# Patient Record
Sex: FEMALE | Race: WHITE | NOT HISPANIC OR LATINO | Employment: OTHER | URBAN - METROPOLITAN AREA
[De-identification: names, ages, dates, MRNs, and addresses within clinical notes are randomized per-mention and may not be internally consistent; named-entity substitution may affect disease eponyms.]

---

## 2017-09-05 ENCOUNTER — OFFICE VISIT (OUTPATIENT)
Dept: OTOLARYNGOLOGY | Facility: CLINIC | Age: 67
End: 2017-09-05
Payer: COMMERCIAL

## 2017-09-05 VITALS
BODY MASS INDEX: 17.97 KG/M2 | SYSTOLIC BLOOD PRESSURE: 80 MMHG | DIASTOLIC BLOOD PRESSURE: 52 MMHG | TEMPERATURE: 98 F | HEART RATE: 63 BPM | WEIGHT: 104.75 LBS

## 2017-09-05 DIAGNOSIS — C02.3 MALIGNANT NEOPLASM OF ANTERIOR TWO-THIRDS OF TONGUE: Primary | ICD-10-CM

## 2017-09-05 DIAGNOSIS — M54.50 CHRONIC BILATERAL LOW BACK PAIN WITHOUT SCIATICA: ICD-10-CM

## 2017-09-05 DIAGNOSIS — G89.29 CHRONIC BILATERAL LOW BACK PAIN WITHOUT SCIATICA: ICD-10-CM

## 2017-09-05 PROCEDURE — 1159F MED LIST DOCD IN RCRD: CPT | Mod: S$GLB,,, | Performed by: OTOLARYNGOLOGY

## 2017-09-05 PROCEDURE — 1157F ADVNC CARE PLAN IN RCRD: CPT | Mod: S$GLB,,, | Performed by: OTOLARYNGOLOGY

## 2017-09-05 PROCEDURE — 99214 OFFICE O/P EST MOD 30 MIN: CPT | Mod: S$GLB,,, | Performed by: OTOLARYNGOLOGY

## 2017-09-05 PROCEDURE — 99999 PR PBB SHADOW E&M-EST. PATIENT-LVL IV: CPT | Mod: PBBFAC,,, | Performed by: OTOLARYNGOLOGY

## 2017-09-05 PROCEDURE — 3008F BODY MASS INDEX DOCD: CPT | Mod: S$GLB,,, | Performed by: OTOLARYNGOLOGY

## 2017-09-05 PROCEDURE — 1126F AMNT PAIN NOTED NONE PRSNT: CPT | Mod: S$GLB,,, | Performed by: OTOLARYNGOLOGY

## 2017-09-05 NOTE — PROGRESS NOTES
[facility]    Subjective:       Patient ID: Aleyda Forman is a 67 y.o. female.    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 Tongue biopsy, 8/23/2012 (Dr. Cope) = well-differentiated SCC  2. Right subtotal glossectomy, right SND I-IV, left SND I-III, tracheostomy, PEG, right ALT free flap; 9/24/2012  3. IMRT, completed 1/2/2013    INTERVAL HISTORY: Aleyda Forman (Ham) returns to the [facility] for follow-up. She did not receive concurrent adjuvant therapy, only IMRT. Even with that, she experienced multiple treatment breaks, and had to have her mask re-fit at least twice. She is tolerating an oral diet, and her PEG tube fell out in November 2014. She mostly eats beans and mashed potatoes, supplementing this with Ensure. She does have some tongue pain, this is worsened by acidic and spicy foods.  This has been going on since her surgery and radiation and is not new. It is unchanged.     Today, she still complains of pain all over her body, only some of which is in her mouth and throat. She reports that the rib cage pain persists and has been worked up, and she shares that the scans did not identify a cause of the pain. This has been going on for about a year. She takes Norco for this, but she still has not been able to find a pain management team.     Past Medical History:   Diagnosis Date    Anemia     Arthritis     Back problem     Cancer 1982    uterine cancer, treated with surgery then radiation    Chronic pancreatitis     Dysphagia, oropharyngeal 3/7/2013    HEARING LOSS     Infection of PEG site 12/31/2013    Radiation     IMRT to head and neck, completed 1/2013    Sinus problem     Thrush 4/17/2015       Past Surgical History:   Procedure Laterality Date     CHOLECYSTECTOMY      GANGLION CYST EXCISION      GASTROSTOMY TUBE PLACEMENT      HYSTERECTOMY  1982    repair of uterine rupture  1973    Right subtotal glossectomy with right SND I-IV, tracheostomy, ALT flap  9/24/2012    TONSILLECTOMY  1966         Current Outpatient Prescriptions:     bisacodyl (DULCOLAX) 10 mg Supp, Place 10 mg rectally., Disp: , Rfl:     cyclobenzaprine (FLEXERIL) 10 MG tablet, Take 10 mg by mouth., Disp: , Rfl:     hydrocodone-acetaminophen 10-325mg (NORCO)  mg Tab, Take 1 tablet by mouth 2 (two) times daily as needed (pain)., Disp: 50 tablet, Rfl: 0    hydrocodone-acetaminophen 10-325mg (NORCO)  mg Tab, Take 1 tablet by mouth., Disp: , Rfl:     meloxicam (MOBIC) 7.5 MG tablet, Take 7.5 mg by mouth., Disp: , Rfl:     polyethylene glycol (GLYCOLAX) 17 gram PwPk, Take 17 g by mouth., Disp: , Rfl:     pregabalin (LYRICA) 50 MG capsule, Take 50 mg by mouth., Disp: , Rfl:     promethazine (PHENERGAN) 12.5 MG Tab, Take 12.5 mg by mouth., Disp: , Rfl:     Allergies   Allergen Reactions    Ultram [Tramadol] Itching and Rash    Morphine Itching and Nausea And Vomiting       Social History     Social History    Marital status: Single     Spouse name: N/A    Number of children: N/A    Years of education: N/A     Occupational History    Not on file.     Social History Main Topics    Smoking status: Former Smoker     Packs/day: 2.00     Quit date: 2/6/2000    Smokeless tobacco: Not on file    Alcohol use No    Drug use: No    Sexual activity: Not on file     Other Topics Concern    Not on file     Social History Narrative    Formerly worked at [employer]. Now retired.           Family History   Problem Relation Age of Onset    Glaucoma Mother     Diabetes Mother     Diabetes Father     Hypertension Maternal Aunt     Stroke Maternal Grandmother     Glaucoma Maternal Grandmother     Diabetes Maternal Grandmother     Diabetes Maternal Grandfather      Review of  Systems   Constitutional: Positive for appetite change (eats predominately soft diet. Her appetite is not great. ) and unexpected weight change (She weighed 104 today. She is unaware that she has lost weight). Negative for activity change, chills, fatigue and fever.   HENT: Positive for sore throat. Negative for dental problem, facial swelling, hearing loss, mouth sores, nosebleeds, postnasal drip, rhinorrhea, sinus pressure, tinnitus and voice change.    Eyes: Positive for visual disturbance.   Respiratory: Negative for choking.    Cardiovascular: Negative for chest pain and palpitations.   Gastrointestinal: Negative for abdominal distention, constipation and nausea (Improved).        History of chronic pancreatitis   Genitourinary: Negative for difficulty urinating and dysuria.   Musculoskeletal: Positive for arthralgias, back pain (chronic), myalgias (right lower chest along the rib cage) and neck stiffness.   Skin: Negative for rash and wound.   Neurological: Negative for syncope and speech difficulty (Improving continually).   Hematological: Negative for adenopathy. Does not bruise/bleed easily.   Psychiatric/Behavioral: Negative for dysphoric mood. The patient is nervous/anxious (chronically).        Objective:      Physical Exam   Constitutional: She is oriented to person, place, and time. She appears well-developed and well-nourished.   HENT:   Head: Normocephalic and atraumatic.   Right Ear: Hearing, tympanic membrane, external ear and ear canal normal.   Left Ear: Hearing, tympanic membrane, external ear and ear canal normal.   Ears:    Nose: No rhinorrhea, nasal deformity or septal deviation. Right sinus exhibits no maxillary sinus tenderness and no frontal sinus tenderness. Left sinus exhibits no maxillary sinus tenderness and no frontal sinus tenderness.   Mouth/Throat: Uvula is midline, oropharynx is clear and moist and mucous membranes are normal. She does not have dentures. No oral lesions. No  trismus in the jaw. Abnormal dentition (edentulous. The right alveolar ridge is atrophic. It will be tough to fit a denture on this atrophic ridge abutting the flap). No lacerations. No posterior oropharyngeal edema.       NP: No lesions of posterior wall  OP: No lesions of posterior wall or BOT. BOT is soft to palpation  Larynx: No lesions of glottic or supraglottic larynx. Normal vocal fold mobility    HP: No lesions of pyriform sinuses or postcricoid region  Mirror examination was performed.    The right posterior tongue and tongue base and tonsil fossa are soft, without a palpable mass. The flap itself is soft without suspicious or abnormal areas. Her pain is out of proportion to the exam.              Eyes: EOM and lids are normal. Pupils are equal, round, and reactive to light. Right conjunctiva is not injected. Left conjunctiva is not injected.   Neck: Full passive range of motion without pain and phonation normal. No JVD present. No tracheal deviation present. No thyroid mass and no thyromegaly present.       Cardiovascular: Normal rate and regular rhythm.    Pulmonary/Chest: Effort normal. No stridor. No respiratory distress.   Abdominal: She exhibits no distension. There is no guarding.   Musculoskeletal: She exhibits no edema or tenderness.        Legs:  Lymphadenopathy:     She has no cervical adenopathy.        Right cervical: No deep cervical and no posterior cervical adenopathy present.       Left cervical: No deep cervical and no posterior cervical adenopathy present.        Right: No supraclavicular adenopathy present.        Left: No supraclavicular adenopathy present.        Neurological: She is alert and oriented to person, place, and time. A cranial nerve deficit is present. Gait normal.   Bilateral CN XI weakness. Weak right marginal mandibular nerve     Skin: No lesion and no rash noted. No cyanosis or erythema. Nails show no clubbing.   Psychiatric: She has a normal mood and affect. Her  speech is normal.   Vitals reviewed.      Last scan reports from 7/2017 reviewed in [facility] Everywhere.   The comment about a tongue mass has no clinical correlate and has been constant for nearly 5 years - this represents her residual native tongue. The PET-CT supports that this is not recurrence, as it is negative. Similarly, the PET-CT does not detect any bony issue that could represent distant disease and be the source of her pain.     Assessment:       1. 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 Chronic bilateral low back pain without sciatica  Ambulatory referral to [facility]     Plan:       She is doing well. She has no evidence of recurrent disease, and she is nearly 5 years out from surgery. She no longer smokes. Her symptoms, particularly the tongue pain, have been stable. Her rib/back pain has been persistent for over a year, and she reports that studies have been done - I have reviewed the results in Epic. The comment about a tongue mass has no clinical correlate and has been constant for nearly 5 years - this represents her residual native tongue. The PET-CT supports that this is not recurrence, as it is negative. Similarly, the PET-CT does not detect any bony issue that could represent distant disease and be the source of her pain.     I have placed a referral for pain management for her chronic rib and back pain.

## 2017-09-05 NOTE — PATIENT INSTRUCTIONS
Please be aware of the symptoms of head and neck cancer -  including, but not limited to, swelling in the neck, changes in voice and swallowing, and pain -- contact me if any of these symptoms appear and persist for more than 3-4 weeks.     Please meet with pain management.    Because you are over 5 years out from surgery, you can follow-up as needed, or if there is ever any change.     Please give your  my best.

## 2017-09-06 ENCOUNTER — TELEPHONE (OUTPATIENT)
Dept: PAIN MEDICINE | Facility: OTHER | Age: 67
End: 2017-09-06

## 2017-09-06 NOTE — TELEPHONE ENCOUNTER
Patient has been referred to pain management, not the [facility].  Please contact to schedule with Dr. Chairez as patient lives in [place].  Thanks!

## 2017-09-07 NOTE — TELEPHONE ENCOUNTER
Staff left a message for patient to give our call center a call to get her schedule with Pain Management in [place] with Dr. Chairez.

## 2019-05-02 ENCOUNTER — TELEPHONE (OUTPATIENT)
Dept: OTOLARYNGOLOGY | Facility: CLINIC | Age: 69
End: 2019-05-02

## 2019-05-02 NOTE — TELEPHONE ENCOUNTER
----- Message from Tara Dallas sent at 5/2/2019  2:33 PM CDT -----  Contact: patient  [phone]-please call above patient at number in message need to get in soon to see the doctor about her tongue waiting on a call from the nurse thanks

## 2019-06-25 ENCOUNTER — OFFICE VISIT (OUTPATIENT)
Dept: OTOLARYNGOLOGY | Facility: CLINIC | Age: 69
End: 2019-06-25
Payer: COMMERCIAL

## 2019-06-25 VITALS
DIASTOLIC BLOOD PRESSURE: 60 MMHG | SYSTOLIC BLOOD PRESSURE: 100 MMHG | BODY MASS INDEX: 21.87 KG/M2 | TEMPERATURE: 98 F | WEIGHT: 127.44 LBS | HEART RATE: 65 BPM

## 2019-06-25 DIAGNOSIS — H61.23 CERUMEN DEBRIS ON TYMPANIC MEMBRANE OF BOTH EARS: ICD-10-CM

## 2019-06-25 DIAGNOSIS — Z85.810 HISTORY OF TONGUE CANCER: ICD-10-CM

## 2019-06-25 DIAGNOSIS — R13.12 DYSPHAGIA, OROPHARYNGEAL: Primary | ICD-10-CM

## 2019-06-25 PROCEDURE — 99213 PR OFFICE/OUTPT VISIT, EST, LEVL III, 20-29 MIN: ICD-10-PCS | Mod: S$GLB,,, | Performed by: OTOLARYNGOLOGY

## 2019-06-25 PROCEDURE — 99999 PR PBB SHADOW E&M-EST. PATIENT-LVL III: CPT | Mod: PBBFAC,,, | Performed by: OTOLARYNGOLOGY

## 2019-06-25 PROCEDURE — 99213 OFFICE O/P EST LOW 20 MIN: CPT | Mod: S$GLB,,, | Performed by: OTOLARYNGOLOGY

## 2019-06-25 PROCEDURE — 1101F PR PT FALLS ASSESS DOC 0-1 FALLS W/OUT INJ PAST YR: ICD-10-PCS | Mod: CPTII,S$GLB,, | Performed by: OTOLARYNGOLOGY

## 2019-06-25 PROCEDURE — 99999 PR PBB SHADOW E&M-EST. PATIENT-LVL III: ICD-10-PCS | Mod: PBBFAC,,, | Performed by: OTOLARYNGOLOGY

## 2019-06-25 PROCEDURE — 1101F PT FALLS ASSESS-DOCD LE1/YR: CPT | Mod: CPTII,S$GLB,, | Performed by: OTOLARYNGOLOGY

## 2019-06-25 RX ORDER — PANTOPRAZOLE SODIUM 40 MG/1
40 TABLET, DELAYED RELEASE ORAL DAILY
Refills: 0 | COMMUNITY
Start: 2019-05-01

## 2019-06-25 RX ORDER — CELECOXIB 100 MG/1
100 CAPSULE ORAL EVERY 12 HOURS
Refills: 0 | COMMUNITY
Start: 2019-06-03

## 2019-06-25 RX ORDER — GABAPENTIN 100 MG/1
100 CAPSULE ORAL
COMMUNITY

## 2019-06-25 RX ORDER — GLIMEPIRIDE 1 MG/1
1 TABLET ORAL DAILY
Refills: 0 | COMMUNITY
Start: 2019-05-01

## 2019-06-25 RX ORDER — NYSTATIN 100000 [USP'U]/ML
SUSPENSION ORAL
Refills: 0 | COMMUNITY
Start: 2019-05-01

## 2019-06-25 RX ORDER — CARVEDILOL 3.12 MG/1
3.12 TABLET ORAL 2 TIMES DAILY
Refills: 30 | COMMUNITY
Start: 2019-04-01

## 2019-06-25 NOTE — PATIENT INSTRUCTIONS
Please be aware of the symptoms of head and neck cancer -  including, but not limited to, swelling in the neck, changes in voice and swallowing, and pain -- contact me if any of these symptoms appear and persist for more than 3-4 weeks.     Please return to see [place] in the survivorship clinic.

## 2019-06-25 NOTE — ASSESSMENT & PLAN NOTE
She has no evidence of recurrent disease. Her PET-CT findings likely represent the activity in the remaining native tongue. Her symptoms have been stable for a long time. She can continue to follow-up with us on a yearly basis, and she can move into the survivorship clinic with [place]

## 2019-06-25 NOTE — PROGRESS NOTES
[facility]    Subjective:       Patient ID: Aleyda Forman is a 69 y.o. female.    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 Tongue biopsy, 8/23/2012 (Dr. Cope) = well-differentiated SCC  2. Right subtotal glossectomy, right SND I-IV, left SND I-III, tracheostomy, PEG, right ALT free flap; 9/24/2012  3. IMRT, completed 1/2/2013    INTERVAL HISTORY: Aleyda Forman (Ham) returns to the [facility] for follow-up after completing multimodality care. She returns after almost 2 years from her last visit and over 6 years from completing treatment complaining of chronic back pain and mouth burning pain that has been unchanged. She has had the back pain worked up. She tongue pain is worse after singing in the choir. She continues to eat soft foods and vegetables and is keeping her weight stable.     There have no changes in her health since the last time I saw her. She is to have cataract surgery soon.     Past Medical History:   Diagnosis Date    Anemia     Arthritis     Back problem     Cancer 1982    uterine cancer, treated with surgery then radiation    Chronic pancreatitis     Dysphagia, oropharyngeal 3/7/2013    HEARING LOSS     Infection of PEG site 12/31/2013    Radiation     IMRT to head and neck, completed 1/2013    Sinus problem     Thrush 4/17/2015       Past Surgical History:   Procedure Laterality Date    CHOLECYSTECTOMY      GANGLION CYST EXCISION      GASTROSTOMY TUBE PLACEMENT      HYSTERECTOMY  1982    repair of uterine rupture  1973    Right subtotal glossectomy with right SND I-IV, tracheostomy, ALT flap  9/24/2012    TONSILLECTOMY  1966         Current Outpatient Medications:     bisacodyl (DULCOLAX) 10 mg Supp, Place 10 mg rectally., Disp: , Rfl:      carvedilol (COREG) 3.125 MG tablet, Take 3.125 mg by mouth 2 (two) times daily., Disp: , Rfl: 30    celecoxib (CELEBREX) 100 MG capsule, Take 100 mg by mouth every 12 (twelve) hours., Disp: , Rfl: 0    cyclobenzaprine (FLEXERIL) 10 MG tablet, Take 10 mg by mouth., Disp: , Rfl:     gabapentin (NEURONTIN) 100 MG capsule, Take 100 mg by mouth., Disp: , Rfl:     glimepiride (AMARYL) 1 MG tablet, Take 1 mg by mouth once daily., Disp: , Rfl: 0    hydrocodone-acetaminophen 10-325mg (NORCO)  mg Tab, Take 1 tablet by mouth 2 (two) times daily as needed (pain)., Disp: 50 tablet, Rfl: 0    hydrocodone-acetaminophen 10-325mg (NORCO)  mg Tab, Take 1 tablet by mouth., Disp: , Rfl:     meloxicam (MOBIC) 7.5 MG tablet, Take 7.5 mg by mouth., Disp: , Rfl:     nystatin (MYCOSTATIN) 100,000 unit/mL suspension, tke 5 ML BY MOUTH FOUR TIMES DAILY, Disp: , Rfl: 0    pantoprazole (PROTONIX) 40 MG tablet, Take 40 mg by mouth once daily., Disp: , Rfl: 0    polyethylene glycol (GLYCOLAX) 17 gram PwPk, Take 17 g by mouth., Disp: , Rfl:     pregabalin (LYRICA) 50 MG capsule, Take 50 mg by mouth., Disp: , Rfl:     promethazine (PHENERGAN) 12.5 MG Tab, Take 12.5 mg by mouth., Disp: , Rfl:     Allergies   Allergen Reactions    Ultram [Tramadol] Itching and Rash    Morphine Itching and Nausea And Vomiting       Social History     Socioeconomic History    Marital status: Single     Spouse name: Not on file    Number of children: Not on file    Years of education: Not on file    Highest education level: Not on file   Occupational History    Not on file   Social Needs    Financial resource strain: Not on file    Food insecurity:     Worry: Not on file     Inability: Not on file    Transportation needs:     Medical: Not on file     Non-medical: Not on file   Tobacco Use    Smoking status: Former Smoker     Packs/day: 2.00     Last attempt to quit: 2000     Years since quittin.3   Substance and Sexual  Activity    Alcohol use: No    Drug use: No    Sexual activity: Not on file   Lifestyle    Physical activity:     Days per week: Not on file     Minutes per session: Not on file    Stress: Not on file   Relationships    Social connections:     Talks on phone: Not on file     Gets together: Not on file     Attends Islam service: Not on file     Active member of club or organization: Not on file     Attends meetings of clubs or organizations: Not on file     Relationship status: Not on file   Other Topics Concern    Not on file   Social History Narrative    Formerly worked at [employer]. Now retired.       Family History   Problem Relation Age of Onset    Glaucoma Mother     Diabetes Mother     Diabetes Father     Hypertension Maternal Aunt     Stroke Maternal Grandmother     Glaucoma Maternal Grandmother     Diabetes Maternal Grandmother     Diabetes Maternal Grandfather      Review of Systems   Constitutional: Positive for appetite change (eats predominately soft diet. Her appetite is not great. ) and unexpected weight change (She weighed 104 today. She is unaware that she has lost weight). Negative for activity change, chills, fatigue and fever.   HENT: Positive for sore throat. Negative for dental problem, facial swelling, hearing loss, mouth sores, nosebleeds, postnasal drip, rhinorrhea, sinus pressure, tinnitus and voice change.    Eyes: Positive for visual disturbance (cataract surgery upcoming).   Respiratory: Negative for choking.    Cardiovascular: Negative for chest pain and palpitations.   Gastrointestinal: Negative for abdominal distention, constipation and nausea (Improved).        History of chronic pancreatitis   Genitourinary: Negative for difficulty urinating and dysuria.   Musculoskeletal: Positive for arthralgias, back pain (chronic), myalgias (right lower chest along the rib cage) and neck stiffness.   Skin: Negative for rash and wound.   Neurological: Positive for  light-headedness (over the last day or so. No true vertigo). Negative for syncope and speech difficulty (Improving continually).   Hematological: Negative for adenopathy. Does not bruise/bleed easily.   Psychiatric/Behavioral: Negative for dysphoric mood. The patient is nervous/anxious (chronically).        Objective:      Physical Exam   Constitutional: She is oriented to person, place, and time. She appears well-developed and well-nourished.   HENT:   Head: Normocephalic and atraumatic.   Right Ear: Hearing and external ear normal.   Left Ear: Hearing and external ear normal.   Ears:    Nose: No rhinorrhea, nasal deformity or septal deviation. Right sinus exhibits no maxillary sinus tenderness and no frontal sinus tenderness. Left sinus exhibits no maxillary sinus tenderness and no frontal sinus tenderness.   Mouth/Throat: Uvula is midline, oropharynx is clear and moist and mucous membranes are normal. She does not have dentures. No oral lesions. No trismus in the jaw. Abnormal dentition (edentulous. The right alveolar ridge is atrophic. It will be tough to fit a denture on this atrophic ridge abutting the flap). No lacerations. No posterior oropharyngeal edema.       NP: No lesions of posterior wall  OP: No lesions of posterior wall or BOT. BOT is soft to palpation  Larynx: No lesions of glottic or supraglottic larynx. Normal vocal fold mobility    HP: No lesions of pyriform sinuses or postcricoid region  Mirror examination was performed.    The right posterior tongue and tongue base and tonsil fossa are soft, without a palpable mass. The flap itself is soft without suspicious or abnormal areas. Her pain is out of proportion to the exam.              Eyes: Pupils are equal, round, and reactive to light. EOM and lids are normal. Right conjunctiva is not injected. Left conjunctiva is not injected.   Neck: Full passive range of motion without pain and phonation normal. No JVD present. No tracheal deviation present.  No thyroid mass and no thyromegaly present.       Cardiovascular: Normal rate and regular rhythm.   Pulmonary/Chest: Effort normal. No stridor. No respiratory distress.   Abdominal: She exhibits no distension. There is no guarding.   Musculoskeletal: She exhibits no edema or tenderness.        Legs:  Lymphadenopathy:     She has no cervical adenopathy.        Right cervical: No deep cervical and no posterior cervical adenopathy present.       Left cervical: No deep cervical and no posterior cervical adenopathy present.        Right: No supraclavicular adenopathy present.        Left: No supraclavicular adenopathy present.        Neurological: She is alert and oriented to person, place, and time. A cranial nerve deficit is present. Gait normal.   Bilateral CN XI weakness. Weak right marginal mandibular nerve     Skin: No lesion and no rash noted. No cyanosis or erythema. Nails show no clubbing.   Psychiatric: She has a normal mood and affect. Her speech is normal.   Vitals reviewed.      PET-CT 4/9/2019:  IMPRESSION:    1. There is non mass like increased uptake within the oral cavity left    of midline with an SUV of 4.5.  This may represent physiologic uptake    but continued follow-up to exclude recurrent disease is recommended.    No regional or distant metastasis are demonstrated.    2. There is low-level increased uptake within the middle 3rd of the    thoracic esophagus which is likely secondary to inflammation.  An    esophageal mass is not demonstrated.    All CT scans at this institution use dose modulation, iterative    reconstruction, and weight based dosing when clinically appropriate to    reduce radiation dose to as low as reasonably achievable.     Assessment & Plan:       Problem List Items Addressed This Visit     History of tongue cancer     She has no evidence of recurrent disease. Her PET-CT findings likely represent the activity in the remaining native tongue. Her symptoms have been stable for a  long time. She can continue to follow-up with us on a yearly basis, and she can move into the survivorship clinic with [place].          Dysphagia, oropharyngeal - Primary    Cerumen debris on tympanic membrane of both ears